# Patient Record
Sex: MALE | Race: WHITE | NOT HISPANIC OR LATINO | Employment: FULL TIME | URBAN - METROPOLITAN AREA
[De-identification: names, ages, dates, MRNs, and addresses within clinical notes are randomized per-mention and may not be internally consistent; named-entity substitution may affect disease eponyms.]

---

## 2020-03-30 ENCOUNTER — COMMUNICATION - HEALTHEAST (OUTPATIENT)
Dept: CARDIOLOGY | Facility: CLINIC | Age: 65
End: 2020-03-30

## 2020-03-31 ENCOUNTER — OFFICE VISIT - HEALTHEAST (OUTPATIENT)
Dept: CARDIOLOGY | Facility: CLINIC | Age: 65
End: 2020-03-31

## 2020-03-31 DIAGNOSIS — R10.13 EPIGASTRIC PAIN: ICD-10-CM

## 2020-03-31 DIAGNOSIS — K21.9 GASTROESOPHAGEAL REFLUX DISEASE, ESOPHAGITIS PRESENCE NOT SPECIFIED: ICD-10-CM

## 2020-03-31 DIAGNOSIS — I10 ESSENTIAL HYPERTENSION: ICD-10-CM

## 2020-03-31 DIAGNOSIS — E66.813 CLASS 3 SEVERE OBESITY DUE TO EXCESS CALORIES WITH SERIOUS COMORBIDITY AND BODY MASS INDEX (BMI) OF 45.0 TO 49.9 IN ADULT (H): ICD-10-CM

## 2020-03-31 DIAGNOSIS — E66.01 CLASS 3 SEVERE OBESITY DUE TO EXCESS CALORIES WITH SERIOUS COMORBIDITY AND BODY MASS INDEX (BMI) OF 45.0 TO 49.9 IN ADULT (H): ICD-10-CM

## 2020-03-31 ASSESSMENT — MIFFLIN-ST. JEOR: SCORE: 1639.01

## 2021-06-04 VITALS
HEART RATE: 74 BPM | HEIGHT: 70 IN | DIASTOLIC BLOOD PRESSURE: 76 MMHG | RESPIRATION RATE: 16 BRPM | SYSTOLIC BLOOD PRESSURE: 116 MMHG | WEIGHT: 188 LBS | BODY MASS INDEX: 26.92 KG/M2

## 2021-06-07 NOTE — TELEPHONE ENCOUNTER
----- Message from Kari Cordova sent at 3/30/2020  3:57 PM CDT -----  Regarding: [facility] PT FOR APPT ON 3/31  General phone call:    Caller: Mick Trujillo     Primary cardiologist: [facility] appt with [facility] on 3/31    Detailed reason for call: Pt calling back to complete wellness screening. Please call back.     Best phone number: [phone]    Best time to contact: Today    Ok to leave a detailed message? Yes    Device? No    Additional Info:

## 2021-06-07 NOTE — TELEPHONE ENCOUNTER
Wellness Screening Tool  Symptom Screening:  Do you have one of the following new symptoms:    Fever or reported chills?  No    A new cough (started within the past 14 days)?  No    Shortness of breath (started within the past 14 days) ?  No     Nausea, vomiting, or diarrhea?  Yes, recently started on GERD meds with improvement.    Within the past 3 weeks, have you been exposed to someone with a known positive illness below:    COVID-19 (known or suspected)?  No    Chicken pox?  No    Mealses?  No    Pertussis?  No    Patient notified of visitor restrictions: Yes  Patient's appointment status: Patient will be seen in clinic as scheduled on 3/31/20. -[email or web address]

## 2021-06-16 PROBLEM — R07.9 CHEST PAIN: Status: ACTIVE | Noted: 2020-03-28

## 2021-06-18 NOTE — PATIENT INSTRUCTIONS - HE
Patient Instructions by Piotr Stoddard DO at 3/31/2020  2:20 PM     Author: Piotr Stoddard DO Service: -- Author Type: Physician    Filed: 3/31/2020  3:00 PM Encounter Date: 3/31/2020 Status: Addendum    : Piotr Stoddard DO (Physician)    Related Notes: Original Note by Piotr Stoddard DO (Physician) filed at 3/31/2020  3:00 PM       It was a pleasure to meet with you today in clinic.  Please do not hesitate to call the [facility] clinic with any questions or concerns at [phone].    Additional Provider Instructions:   1.  Continue to monitor symptoms.     Sincerely,

## 2021-06-28 NOTE — PROGRESS NOTES
Progress Notes by Piotr Stoddard DO at 3/31/2020  2:20 PM     Author: Piotr Stoddard DO Service: -- Author Type: Physician    Filed: 3/31/2020  3:47 PM Encounter Date: 3/31/2020 Status: Signed    : Piotr Stoddard DO (Physician)         Thank you, Dr. Ramirez, for asking the [facility] team to see Mr. Mick Jolley to evaluate epigastric pain.      Assessment/Recommendations   Assessment:    1. Epigastric pain, consistent with gastroesophageal reflux.  Symptoms have improved with omeprazole the past few days.  Symptoms are nonexertional.  Patient walks 5 miles yesterday with no cardiac symptoms  2. Hypertention  3. GERD    Plan:  1.  Continue omeprazole as prescribed for the emergency department  2.  If patient develops worsening symptoms or chest pain with exertion or dyspnea on exertion would consider exercise stress testing  3.  Continue lisinopril and hydrochlorothiazide for hypertension.    Follow-up if symptoms worsen.  Call instructions were provided       History of Present Illness/Subjective    Mr. Mick Jolley is a 64 y.o. male with history of hypertension who presents to cardiology clinic in rapid access in consultation for epigastric pain.    Patient states that a day or 2 before he presented to the emergency department on March 28 he had burning sensation in his chest with soreness in his throat consistent with acid reflux.  He notes that he had chili on Thursday when his gastric burning sensation began.  States the symptoms did not worsen with activity.  Were not associated with shortness of breath fever chills or sweats.  He had no dark stools or signs of bleeding.    He was then evaluated the emergency department underwent CT scan which did not demonstrate any pathology.  She was admitted for further evaluation but discharged on arrival to the floor.  He was started on omeprazole and his symptoms alleviated immediately with a GI cocktail in the  emergency department.  His EKG was reviewed did not demonstrate any significant findings concerning for ischemia.  He also had troponin levels were negative.  He was started omeprazole therapy twice a day and has noted no further episodes of epigastric pain or burning in his chest.  He still has a slight sore throat or irritation in his throat.  Otherwise he is feeling well.  He has been active and he walks 5 miles per day including yesterday with no cardiac symptoms.     ED note was reviewed.  EKG was reviewed.  Results from CT scan were reviewed discharge summary from hospitalization was also reviewed    ECG: Personally reviewed. 3/28/2020. normal EKG, normal sinus rhythm.  Serial ECG review.       CT Abd:   IMPRESSION:   1.  No acute cause of pain identified.  2.  Enlarged prostate.        Physical Examination Review of Systems   Vitals:    03/31/20 1428   BP: 116/76   Pulse: 74   Resp: 16     Body mass index is 26.98 kg/m .  Wt Readings from Last 3 Encounters:   03/28/20 188 lb (85.3 kg)     [unfilled]  General Appearance:   no distress, normal body habitus   ENT/Mouth: membranes moist, no oral lesions or bleeding gums.      EYES:  no scleral icterus, normal conjunctivae   Neck: no carotid bruits or thyromegaly   Chest/Lungs:   lungs are clear to auscultation, no rales or wheezing, no sternal scar, equal chest wall expansion    Cardiovascular:   Regular. Normal first and second heart sounds with no murmurs, rubs, or gallops; the carotid, radial and posterior tibial pulses are intact, Jugular venous pressure no, normal edema bilaterally    Abdomen:  no organomegaly, masses, bruits, or tenderness; bowel sounds are present   Extremities: no cyanosis or clubbing   Skin: no xanthelasma, warm.    Neurologic: normal  bilateral, no tremors     Psychiatric: alert and oriented x3, calm     General: WNL  Eyes: WNL  Ears/Nose/Throat: WNL  Lungs: WNL  Heart: WNL  Stomach: Heartburn  Bladder: WNL  Muscle/Joints: WNL  Skin:  WNL  Nervous System: WNL  Mental Health: WNL     Blood: WNL       Medical History  Surgical History Family History Social History   Past Medical History:   Diagnosis Date   ? HTN (hypertension)     Past Surgical History:   Procedure Laterality Date   ? [place] CORNEAL TRANSPLANT,LAMELLAR      Description: Cornea Transplant Left Eye;  Recorded: 01/14/2011;   ? [place] LAP,CHOLECYSTECTOMY      Description: Cholecystectomy Laparoscopic;  Recorded: 10/20/2013;    Family History   Problem Relation Age of Onset   ? CABG Father     Social History     Socioeconomic History   ? Marital status:      Spouse name: Not on file   ? Number of children: Not on file   ? Years of education: Not on file   ? Highest education level: Not on file   Occupational History   ? Not on file   Social Needs   ? Financial resource strain: Not on file   ? Food insecurity     Worry: Not on file     Inability: Not on file   ? Transportation needs     Medical: Not on file     Non-medical: Not on file   Tobacco Use   ? Smoking status: Never Smoker   ? Smokeless tobacco: Never Used   Substance and Sexual Activity   ? Alcohol use: Yes     Alcohol/week: 1.0 standard drinks     Types: 1 Cans of beer per week   ? Drug use: Never   ? Sexual activity: Not on file   Lifestyle   ? Physical activity     Days per week: Not on file     Minutes per session: Not on file   ? Stress: Not on file   Relationships   ? Social connections     Talks on phone: Not on file     Gets together: Not on file     Attends Druze service: Not on file     Active member of club or organization: Not on file     Attends meetings of clubs or organizations: Not on file     Relationship status: Not on file   ? Intimate partner violence     Fear of current or ex partner: Not on file     Emotionally abused: Not on file     Physically abused: Not on file     Forced sexual activity: Not on file   Other Topics Concern   ? Not on file   Social History Narrative   ? Not on file           Medications  Allergies   Current Outpatient Medications   Medication Sig Dispense Refill   ? acetaminophen (TYLENOL) 325 MG tablet Take 325 mg by mouth every 6 (six) hours as needed for pain.     ? lisinopriL-hydrochlorothiazide (PRINZIDE,ZESTORETIC) 10-12.5 mg per tablet Take 1 tablet by mouth daily.     ? omeprazole (PRILOSEC) 20 MG capsule Take 1 capsule (20 mg total) by mouth daily before breakfast. 30 capsule 0   ? tamsulosin (FLOMAX) 0.4 mg cap Take 0.4 mg by mouth daily.       No current facility-administered medications for this visit.     No Known Allergies      Lab Results    Chemistry/lipid CBC Cardiac Enzymes/BNP/TSH/INR   Lab Results   Component Value Date    CHOL 176 05/03/2013    HDL 56 05/03/2013    LDLCALC 111 05/03/2013    TRIG 47 05/03/2013    CREATININE 0.83 03/28/2020    BUN 13 03/28/2020    K 3.7 03/28/2020     (L) 03/28/2020    CL 96 (L) 03/28/2020    CO2 23 03/28/2020    Lab Results   Component Value Date    WBC 8.5 03/28/2020    HGB 14.6 03/28/2020    HCT 39.9 (L) 03/28/2020    MCV 89 03/28/2020     03/28/2020    Lab Results   Component Value Date    TROPONINI 0.01 03/28/2020